# Patient Record
Sex: MALE | Race: WHITE | NOT HISPANIC OR LATINO | URBAN - METROPOLITAN AREA
[De-identification: names, ages, dates, MRNs, and addresses within clinical notes are randomized per-mention and may not be internally consistent; named-entity substitution may affect disease eponyms.]

---

## 2018-07-14 ENCOUNTER — APPOINTMENT (OUTPATIENT)
Dept: LAB | Facility: HOSPITAL | Age: 33
End: 2018-07-14
Payer: COMMERCIAL

## 2018-07-14 ENCOUNTER — TRANSCRIBE ORDERS (OUTPATIENT)
Dept: LAB | Facility: HOSPITAL | Age: 33
End: 2018-07-14

## 2018-07-14 DIAGNOSIS — Z00.00 ROUTINE GENERAL MEDICAL EXAMINATION AT A HEALTH CARE FACILITY: Primary | ICD-10-CM

## 2018-07-14 DIAGNOSIS — Z00.00 ROUTINE GENERAL MEDICAL EXAMINATION AT A HEALTH CARE FACILITY: ICD-10-CM

## 2018-07-14 LAB
ALBUMIN SERPL BCP-MCNC: 4.1 G/DL (ref 3.5–5)
ALP SERPL-CCNC: 66 U/L (ref 46–116)
ALT SERPL W P-5'-P-CCNC: 37 U/L (ref 12–78)
ANION GAP SERPL CALCULATED.3IONS-SCNC: 6 MMOL/L (ref 4–13)
AST SERPL W P-5'-P-CCNC: 22 U/L (ref 5–45)
BASOPHILS # BLD AUTO: 0.04 THOUSANDS/ΜL (ref 0–0.1)
BASOPHILS NFR BLD AUTO: 1 % (ref 0–1)
BILIRUB SERPL-MCNC: 0.6 MG/DL (ref 0.2–1)
BUN SERPL-MCNC: 17 MG/DL (ref 5–25)
CALCIUM SERPL-MCNC: 8.9 MG/DL (ref 8.3–10.1)
CHLORIDE SERPL-SCNC: 103 MMOL/L (ref 100–108)
CHOLEST SERPL-MCNC: 195 MG/DL (ref 50–200)
CO2 SERPL-SCNC: 30 MMOL/L (ref 21–32)
CREAT SERPL-MCNC: 0.98 MG/DL (ref 0.6–1.3)
EOSINOPHIL # BLD AUTO: 0.14 THOUSAND/ΜL (ref 0–0.61)
EOSINOPHIL NFR BLD AUTO: 2 % (ref 0–6)
ERYTHROCYTE [DISTWIDTH] IN BLOOD BY AUTOMATED COUNT: 12.2 % (ref 11.6–15.1)
GFR SERPL CREATININE-BSD FRML MDRD: 102 ML/MIN/1.73SQ M
GLUCOSE SERPL-MCNC: 75 MG/DL (ref 65–140)
HCT VFR BLD AUTO: 45.1 % (ref 36.5–49.3)
HDLC SERPL-MCNC: 35 MG/DL (ref 40–60)
HGB BLD-MCNC: 15 G/DL (ref 12–17)
IMM GRANULOCYTES # BLD AUTO: 0.01 THOUSAND/UL (ref 0–0.2)
IMM GRANULOCYTES NFR BLD AUTO: 0 % (ref 0–2)
LDLC SERPL CALC-MCNC: 108 MG/DL (ref 0–100)
LYMPHOCYTES # BLD AUTO: 2.38 THOUSANDS/ΜL (ref 0.6–4.47)
LYMPHOCYTES NFR BLD AUTO: 40 % (ref 14–44)
MCH RBC QN AUTO: 28.4 PG (ref 26.8–34.3)
MCHC RBC AUTO-ENTMCNC: 33.3 G/DL (ref 31.4–37.4)
MCV RBC AUTO: 85 FL (ref 82–98)
MONOCYTES # BLD AUTO: 0.62 THOUSAND/ΜL (ref 0.17–1.22)
MONOCYTES NFR BLD AUTO: 11 % (ref 4–12)
NEUTROPHILS # BLD AUTO: 2.72 THOUSANDS/ΜL (ref 1.85–7.62)
NEUTS SEG NFR BLD AUTO: 46 % (ref 43–75)
NONHDLC SERPL-MCNC: 160 MG/DL
NRBC BLD AUTO-RTO: 0 /100 WBCS
PLATELET # BLD AUTO: 202 THOUSANDS/UL (ref 149–390)
PMV BLD AUTO: 9.6 FL (ref 8.9–12.7)
POTASSIUM SERPL-SCNC: 4.2 MMOL/L (ref 3.5–5.3)
PROT SERPL-MCNC: 7.6 G/DL (ref 6.4–8.2)
RBC # BLD AUTO: 5.29 MILLION/UL (ref 3.88–5.62)
SODIUM SERPL-SCNC: 139 MMOL/L (ref 136–145)
T4 FREE SERPL-MCNC: 0.82 NG/DL (ref 0.76–1.46)
TRIGL SERPL-MCNC: 262 MG/DL
TSH SERPL DL<=0.05 MIU/L-ACNC: 4.17 UIU/ML (ref 0.36–3.74)
WBC # BLD AUTO: 5.91 THOUSAND/UL (ref 4.31–10.16)

## 2018-07-14 PROCEDURE — 86618 LYME DISEASE ANTIBODY: CPT

## 2018-07-14 PROCEDURE — 84443 ASSAY THYROID STIM HORMONE: CPT

## 2018-07-14 PROCEDURE — 85025 COMPLETE CBC W/AUTO DIFF WBC: CPT

## 2018-07-14 PROCEDURE — 80061 LIPID PANEL: CPT

## 2018-07-14 PROCEDURE — 84439 ASSAY OF FREE THYROXINE: CPT

## 2018-07-14 PROCEDURE — 36415 COLL VENOUS BLD VENIPUNCTURE: CPT

## 2018-07-14 PROCEDURE — 80053 COMPREHEN METABOLIC PANEL: CPT

## 2018-07-17 LAB
B BURGDOR IGG SER IA-ACNC: 0.09
B BURGDOR IGM SER IA-ACNC: 0.19

## 2024-07-18 ENCOUNTER — HOSPITAL ENCOUNTER (EMERGENCY)
Facility: HOSPITAL | Age: 39
Discharge: HOME | End: 2024-07-18
Attending: EMERGENCY MEDICINE

## 2024-07-18 VITALS
SYSTOLIC BLOOD PRESSURE: 173 MMHG | RESPIRATION RATE: 18 BRPM | DIASTOLIC BLOOD PRESSURE: 105 MMHG | HEART RATE: 79 BPM | TEMPERATURE: 97.4 F

## 2024-07-18 DIAGNOSIS — M54.2 NECK PAIN: Primary | ICD-10-CM

## 2024-07-18 DIAGNOSIS — I10 HYPERTENSION, UNSPECIFIED TYPE: ICD-10-CM

## 2024-07-18 PROCEDURE — 99284 EMERGENCY DEPT VISIT MOD MDM: CPT | Performed by: EMERGENCY MEDICINE

## 2024-07-18 PROCEDURE — 99283 EMERGENCY DEPT VISIT LOW MDM: CPT

## 2024-07-18 RX ORDER — METHOCARBAMOL 500 MG/1
500 TABLET, FILM COATED ORAL 2 TIMES DAILY
Qty: 20 TABLET | Refills: 0 | Status: SHIPPED | OUTPATIENT
Start: 2024-07-18

## 2024-07-18 RX ORDER — IBUPROFEN 600 MG/1
600 TABLET ORAL EVERY 6 HOURS PRN
Qty: 30 TABLET | Refills: 0 | Status: SHIPPED | OUTPATIENT
Start: 2024-07-18

## 2024-07-18 NOTE — DISCHARGE INSTRUCTIONS
I believe that your neck pain is secondary to musculoskeletal spasm related to lifting 80 pound bags of concrete.  You can take Motrin and muscle relaxants for your discomfort.  I do not believe that your pain is secondary to an injury to the blood vessel in your neck.  Your symptoms have been present for several days, and have not progressed, and you have a normal neurological exam.  You should return to the emergency department immediately if you develop numbness, tingling, weakness, change in the size of 1 pupil compared to the other, difficulty speaking, difficulty walking, severe headache, or any other concerns.  Additionally, you had an elevated blood pressure reading here today.  This does not mean that you have high blood pressure, however it does mean that your blood pressure needs to be rechecked.  You should recheck your blood pressure at a drugstore or with your primary care doctor in the next 1 week.  If your blood pressure continues to be elevated, you should follow-up with a primary care doctor regarding further evaluation and management of your blood pressure.

## 2024-07-18 NOTE — ED PROVIDER NOTES
"History  Chief Complaint   Patient presents with    Medical Problem     Felt something \"pop\" on L side of face, now having pulling sensation on left side of neck.      HPI this is a 39-year-old male who presents to the emergency department with a throbbing pain in his left temple, and a pulling pain in his neck.  Patient states that his symptoms started while lifting 80 bag pounds of concrete on Saturday.  States that it started fairly abruptly while lifting, has since gotten better, but is still present.  He came in because he was concerned and thought maybe he should do something about his symptoms.  The patient states that he does not have numbness, tingling, or weakness.  The pain is not tearing and has not radiated.  He has not changed at all since Saturday.  States that the pain is extremely mild, localizes it to his left cheek.  No tinnitus.  No dizziness.  No clumsiness.  No visual field changes or blurry vision.  The patient does not have pain with ranging his neck, but states that the pain is somewhat reproducible by palpating his neck.  Patient is healthy with no underlying history of hypertension or connective tissue disease.  Review of systems otherwise negative in 12 systems were reviewed.    None       No past medical history on file.    No past surgical history on file.    No family history on file.  I have reviewed and agree with the history as documented.    E-Cigarette/Vaping     E-Cigarette/Vaping Substances     Social History     Tobacco Use    Smoking status: Never    Smokeless tobacco: Never   Substance Use Topics    Alcohol use: Never       Review of Systems  Negative in 12 systems reviewed.  Patient states he does not routinely see primary care.  Physical Exam  Physical Exam    Vital Signs  ED Triage Vitals   Temperature Pulse Respirations Blood Pressure SpO2   07/18/24 1214 07/18/24 1214 07/18/24 1214 07/18/24 1216 --   (!) 97.4 °F (36.3 °C) 79 18 (!) 173/105       Temp Source Heart Rate " Source Patient Position - Orthostatic VS BP Location FiO2 (%)   07/18/24 1214 07/18/24 1214 -- -- --   Temporal Monitor         Pain Score       --                  Vitals:    07/18/24 1214 07/18/24 1216   BP:  (!) 173/105   Pulse: 79      On exam patient is awake and alert.  His face is symmetric.  He has no anisocoria.  His cranial nerves are intact.  The patient does not have any bruits in the neck.  He does not any masses in the neck.  He does have tenderness to palpation over sternocleidomastoid on the left.  The patient has normal rapid alternating movements.  He has normal finger-to-nose.  He has intact reflexes.  He has steady gait.  His heart is regular without murmurs, rubs, or gallop.  Lungs are clear with good air movement.  Abdomen is soft and nontender with no masses, rebound, or guarding.  His extremities are warm and well-perfused.  He is neurologically entirely intact    Visual Acuity      ED Medications  Medications - No data to display    Diagnostic Studies  Results Reviewed       None                   No orders to display              Procedures  Procedures         ED Course                                               Medical Decision Making  Risk  Prescription drug management.             MEDICAL DECISION MAKING    Number and Complexity of Problems  Differential diagnosis: Had extensive discussion with patient regarding his symptoms.  Although I doubt that the patient has a dissection, I cannot completely take it off the table given these symptoms while lifting a heavy object.  The patient does not have any neurological deficits at this time.  The patient does not wish to undergo any CAT scan or neuroimaging at this time.  He has not attempted any symptom control.  Discussed with the patient that he would like to try symptom management at home, will return for any concerning findings including change in pupil diameter, numbness, tingling, weakness, headache, difficulty walking, difficulty  speaking, or any other concerns    Medical Decision Making Data  External documents reviewed:   My EKG interpretation:   My CT interpretation:   My X-ray interpretation:   My ultrasound interpretation:     No orders to display       Labs Reviewed - No data to display    Labs reviewed by me are significant for:     Clinical decision rules/scores are significant for:     Discussed case with:   Considered admission for:     Treatment and Disposition  ED course:   Shared decision making: Patient would like to forego CT scanning at this time, this is discussed with the patient and his father  Code status: sposition  Final diagnoses:   Neck pain   Hypertension, unspecified type     Time reflects when diagnosis was documented in both MDM as applicable and the Disposition within this note       Time User Action Codes Description Comment    7/18/2024 12:32 PM Tiara Haque [M54.2] Neck pain     7/18/2024 12:32 PM Tiara Haque Add [I10] Hypertension, unspecified type           ED Disposition       ED Disposition   Discharge    Condition   Stable    Date/Time   Thu Jul 18, 2024 12:31 PM    Comment   Francoise Delpriore discharge to home/self care.                   Follow-up Information    None         Discharge Medication List as of 7/18/2024 12:35 PM        START taking these medications    Details   ibuprofen (MOTRIN) 600 mg tablet Take 1 tablet (600 mg total) by mouth every 6 (six) hours as needed for mild pain, Starting Thu 7/18/2024, Normal      methocarbamol (ROBAXIN) 500 mg tablet Take 1 tablet (500 mg total) by mouth 2 (two) times a day, Starting Thu 7/18/2024, Normal             No discharge procedures on file.    PDMP Review       None            ED Provider  Electronically Signed by             Tiara Haque MD  07/18/24 2658

## 2024-09-04 ENCOUNTER — HOSPITAL ENCOUNTER (EMERGENCY)
Facility: HOSPITAL | Age: 39
Discharge: HOME | End: 2024-09-04
Attending: EMERGENCY MEDICINE

## 2024-09-04 VITALS
RESPIRATION RATE: 18 BRPM | HEART RATE: 69 BPM | TEMPERATURE: 98.5 F | OXYGEN SATURATION: 98 % | DIASTOLIC BLOOD PRESSURE: 92 MMHG | SYSTOLIC BLOOD PRESSURE: 148 MMHG
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
## 2024-09-04 DIAGNOSIS — S40.861A INSECT BITE OF RIGHT UPPER ARM, INITIAL ENCOUNTER: Primary | ICD-10-CM

## 2024-09-04 PROCEDURE — 99284 EMERGENCY DEPT VISIT MOD MDM: CPT | Performed by: EMERGENCY MEDICINE

## 2024-09-04 PROCEDURE — 99281 EMR DPT VST MAYX REQ PHY/QHP: CPT

## 2024-09-05 NOTE — DISCHARGE INSTRUCTIONS
Return to the emergency department if your insect bite worsens, gets more swollen or red, or if you develop fever

## 2024-09-05 NOTE — ED PROVIDER NOTES
History  Chief Complaint   Patient presents with    Insect Bite     Got bit 2-3 days ago, was hanging out with friends and noticed faint red line on R arm, redness around site, tender, denies other symptoms     39-year-old male with no significant past medical history presents to ED for evaluation of insect bite.  Patient reports he got bit by an insect 2 to 3 days ago on his right upper arm.  He reports that there is surrounding redness.  He was instructed by his friends to come to the emergency department for evaluation.  The lesion is not pruritic, but he reports mild pain.  He has not taken any antihistamine medication or pain medication prior to arrival.  Denies fever, headache.        Prior to Admission Medications   Prescriptions Last Dose Informant Patient Reported? Taking?   ibuprofen (MOTRIN) 600 mg tablet   No No   Sig: Take 1 tablet (600 mg total) by mouth every 6 (six) hours as needed for mild pain   methocarbamol (ROBAXIN) 500 mg tablet   No No   Sig: Take 1 tablet (500 mg total) by mouth 2 (two) times a day      Facility-Administered Medications: None       History reviewed. No pertinent past medical history.    History reviewed. No pertinent surgical history.    History reviewed. No pertinent family history.  I have reviewed and agree with the history as documented.    E-Cigarette/Vaping     E-Cigarette/Vaping Substances     Social History     Tobacco Use    Smoking status: Never    Smokeless tobacco: Never   Substance Use Topics    Alcohol use: Never        Review of Systems   Constitutional:  Negative for chills and fever.   Respiratory:  Negative for cough and shortness of breath.    Cardiovascular:  Negative for chest pain.   Musculoskeletal:  Negative for arthralgias and joint swelling.   Skin:  Positive for color change.   Neurological:  Negative for weakness and numbness.       Physical Exam  ED Triage Vitals [09/04/24 2006]   Temperature Pulse Respirations Blood Pressure SpO2   98.5 °F (36.9  °C) 69 18 148/92 98 %      Temp Source Heart Rate Source Patient Position - Orthostatic VS BP Location FiO2 (%)   Temporal Monitor Sitting Left arm --      Pain Score       --             Orthostatic Vital Signs  Vitals:    09/04/24 2006   BP: 148/92   Pulse: 69   Patient Position - Orthostatic VS: Sitting       Physical Exam  Vitals and nursing note reviewed.   Constitutional:       General: He is not in acute distress.     Appearance: Normal appearance. He is normal weight. He is not ill-appearing, toxic-appearing or diaphoretic.   HENT:      Mouth/Throat:      Mouth: Mucous membranes are moist.   Cardiovascular:      Rate and Rhythm: Normal rate.   Pulmonary:      Effort: Pulmonary effort is normal.   Skin:     General: Skin is warm and dry.      Comments: 2 cm circular lesion with central punctum on the right upper arm.  No associated swelling.  No tenderness to palpation or drainage   Neurological:      Mental Status: He is alert.         ED Medications  Medications - No data to display    Diagnostic Studies  Results Reviewed       None                   No orders to display         Procedures  Procedures      ED Course                             SBIRT 20yo+      Flowsheet Row Most Recent Value   Initial Alcohol Screen: US AUDIT-C     1. How often do you have a drink containing alcohol? 0 Filed at: 09/04/2024 2006   2. How many drinks containing alcohol do you have on a typical day you are drinking?  0 Filed at: 09/04/2024 2006   3a. Male UNDER 65: How often do you have five or more drinks on one occasion? 0 Filed at: 09/04/2024 2006   3b. FEMALE Any Age, or MALE 65+: How often do you have 4 or more drinks on one occassion? 0 Filed at: 09/04/2024 2006   Audit-C Score 0 Filed at: 09/04/2024 2006   LAUREN: How many times in the past year have you...    Used an illegal drug or used a prescription medication for non-medical reasons? Never Filed at: 09/04/2024 2006                  Medical Decision  Making  39-year-old male presents ED for evaluation of right upper extremity lesion from an insect bite 2 to 3 days ago.  Patient has had no fever.  Differential diagnosis: I suspect localized reaction, I do not suspect cellulitis based on patient's lack of systemic symptoms, lack of pain or drainage.  Plan: Will discharge to home.  Return precautions discussed with patient including worsening of the lesion, fever, increased pain or swelling.          Disposition  Final diagnoses:   Insect bite of right upper arm, initial encounter     Time reflects when diagnosis was documented in both MDM as applicable and the Disposition within this note       Time User Action Codes Description Comment    9/4/2024  8:35 PM Ok Benson Add [S40.861A,  W57.XXXA] Insect bite of right upper arm, initial encounter           ED Disposition       ED Disposition   Discharge    Condition   Stable    Date/Time   Wed Sep 4, 2024 2035    Comment   Francoise Sobeida discharge to home/self care.                   Follow-up Information       Follow up With Specialties Details Why Contact Info    Hitesh Dotson DO Family Medicine Call  As needed [address] [facility]  [address] [place] [address]  [phone]              Discharge Medication List as of 9/4/2024  8:39 PM        CONTINUE these medications which have NOT CHANGED    Details   ibuprofen (MOTRIN) 600 mg tablet Take 1 tablet (600 mg total) by mouth every 6 (six) hours as needed for mild pain, Starting Thu 7/18/2024, Normal      methocarbamol (ROBAXIN) 500 mg tablet Take 1 tablet (500 mg total) by mouth 2 (two) times a day, Starting Thu 7/18/2024, Normal           No discharge procedures on file.    PDMP Review       None             ED Provider  Attending physically available and evaluated Francoise Sobeida. I managed the patient along with the ED Attending.    Electronically Signed by           Ok Benson DO  09/04/24 6717

## 2024-09-05 NOTE — ED ATTENDING ATTESTATION
9/4/2024  I, Josefa Gray MD, saw and evaluated the patient. I have discussed the patient with the resident/non-physician practitioner and agree with the resident's/non-physician practitioner's findings, Plan of Care, and MDM as documented in the resident's/non-physician practitioner's note, except where noted. All available labs and Radiology studies were reviewed.  I was present for key portions of any procedure(s) performed by the resident/non-physician practitioner and I was immediately available to provide assistance.       At this point I agree with the current assessment done in the Emergency Department.  I have conducted an independent evaluation of this patient a history and physical is as follows:    ED Course         Critical Care Time  Procedures    38 yo male with insect bite to right upper arm few days ago and noted mild tracking of redness.  No warmth, no drainage. No itching. Pt with no fever, no n/v/d.  Vss, afebrile, lungs cta, rrr, abdomen soft nontender, right arm with local inflammatory reaction, no signs of infection. Reassurance.

## 2025-04-30 ENCOUNTER — APPOINTMENT (EMERGENCY)
Dept: RADIOLOGY | Facility: HOSPITAL | Age: 40
End: 2025-04-30

## 2025-04-30 ENCOUNTER — HOSPITAL ENCOUNTER (EMERGENCY)
Facility: HOSPITAL | Age: 40
Discharge: HOME | End: 2025-04-30
Attending: EMERGENCY MEDICINE

## 2025-04-30 VITALS
TEMPERATURE: 98.2 F | HEART RATE: 64 BPM | OXYGEN SATURATION: 96 % | RESPIRATION RATE: 18 BRPM | SYSTOLIC BLOOD PRESSURE: 150 MMHG | DIASTOLIC BLOOD PRESSURE: 87 MMHG

## 2025-04-30 DIAGNOSIS — S09.90XA INJURY OF HEAD, INITIAL ENCOUNTER: Primary | ICD-10-CM
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
## 2025-04-30 LAB
ALBUMIN SERPL BCG-MCNC: 4.8 G/DL (ref 3.5–5)
ALP SERPL-CCNC: 64 U/L (ref 34–104)
ALT SERPL W P-5'-P-CCNC: 23 U/L (ref 7–52)
ANION GAP SERPL CALCULATED.3IONS-SCNC: 8 MMOL/L (ref 4–13)
AST SERPL W P-5'-P-CCNC: 24 U/L (ref 13–39)
BASOPHILS # BLD AUTO: 0.04 THOUSANDS/ÂΜL (ref 0–0.1)
BASOPHILS NFR BLD AUTO: 1 % (ref 0–1)
BILIRUB SERPL-MCNC: 1.35 MG/DL (ref 0.2–1)
BUN SERPL-MCNC: 18 MG/DL (ref 5–25)
CALCIUM SERPL-MCNC: 9.3 MG/DL (ref 8.4–10.2)
CHLORIDE SERPL-SCNC: 104 MMOL/L (ref 96–108)
CO2 SERPL-SCNC: 26 MMOL/L (ref 21–32)
CREAT SERPL-MCNC: 0.97 MG/DL (ref 0.6–1.3)
EOSINOPHIL # BLD AUTO: 0.11 THOUSAND/ÂΜL (ref 0–0.61)
EOSINOPHIL NFR BLD AUTO: 2 % (ref 0–6)
ERYTHROCYTE [DISTWIDTH] IN BLOOD BY AUTOMATED COUNT: 12.3 % (ref 11.6–15.1)
GFR SERPL CREATININE-BSD FRML MDRD: 98 ML/MIN/1.73SQ M
GLUCOSE SERPL-MCNC: 82 MG/DL (ref 65–140)
HCT VFR BLD AUTO: 44.4 % (ref 36.5–49.3)
HGB BLD-MCNC: 15.5 G/DL (ref 12–17)
IMM GRANULOCYTES # BLD AUTO: 0.01 THOUSAND/UL (ref 0–0.2)
IMM GRANULOCYTES NFR BLD AUTO: 0 % (ref 0–2)
LYMPHOCYTES # BLD AUTO: 1.21 THOUSANDS/ÂΜL (ref 0.6–4.47)
LYMPHOCYTES NFR BLD AUTO: 25 % (ref 14–44)
MCH RBC QN AUTO: 29.1 PG (ref 26.8–34.3)
MCHC RBC AUTO-ENTMCNC: 34.9 G/DL (ref 31.4–37.4)
MCV RBC AUTO: 83 FL (ref 82–98)
MONOCYTES # BLD AUTO: 0.54 THOUSAND/ÂΜL (ref 0.17–1.22)
MONOCYTES NFR BLD AUTO: 11 % (ref 4–12)
NEUTROPHILS # BLD AUTO: 2.95 THOUSANDS/ÂΜL (ref 1.85–7.62)
NEUTS SEG NFR BLD AUTO: 61 % (ref 43–75)
NRBC BLD AUTO-RTO: 0 /100 WBCS
PLATELET # BLD AUTO: 215 THOUSANDS/UL (ref 149–390)
PMV BLD AUTO: 9 FL (ref 8.9–12.7)
POTASSIUM SERPL-SCNC: 3.9 MMOL/L (ref 3.5–5.3)
PROT SERPL-MCNC: 7.3 G/DL (ref 6.4–8.4)
RBC # BLD AUTO: 5.33 MILLION/UL (ref 3.88–5.62)
SODIUM SERPL-SCNC: 138 MMOL/L (ref 135–147)
WBC # BLD AUTO: 4.86 THOUSAND/UL (ref 4.31–10.16)

## 2025-04-30 PROCEDURE — 70450 CT HEAD/BRAIN W/O DYE: CPT

## 2025-04-30 PROCEDURE — 99284 EMERGENCY DEPT VISIT MOD MDM: CPT

## 2025-04-30 PROCEDURE — 36415 COLL VENOUS BLD VENIPUNCTURE: CPT

## 2025-04-30 PROCEDURE — 93005 ELECTROCARDIOGRAM TRACING: CPT

## 2025-04-30 PROCEDURE — 99285 EMERGENCY DEPT VISIT HI MDM: CPT | Performed by: EMERGENCY MEDICINE

## 2025-04-30 PROCEDURE — 85025 COMPLETE CBC W/AUTO DIFF WBC: CPT

## 2025-04-30 PROCEDURE — 80053 COMPREHEN METABOLIC PANEL: CPT

## 2025-04-30 RX ORDER — ONDANSETRON 4 MG/1
4 TABLET, ORALLY DISINTEGRATING ORAL EVERY 6 HOURS PRN
Qty: 30 TABLET | Refills: 0 | Status: SHIPPED | OUTPATIENT
Start: 2025-04-30

## 2025-04-30 NOTE — Clinical Note
Francoise Vidales was seen and treated in our emergency department on 4/30/2025.            No work until cleared by family doctor/concussion clinic    Diagnosis:     Francoise  .    He may return on this date:          If you have any questions or concerns, please don't hesitate to call.      Edgardo Ni MD    ______________________________           _______________          _______________  Hospital Representative                              Date                                Time

## 2025-04-30 NOTE — ED ATTENDING ATTESTATION
4/30/2025  I, Shaheed Mujica MD, saw and evaluated the patient. I have discussed the patient with the resident/non-physician practitioner and agree with the resident's/non-physician practitioner's findings, Plan of Care, and MDM as documented in the resident's/non-physician practitioner's note, except where noted. All available labs and Radiology studies were reviewed.  I was present for key portions of any procedure(s) performed by the resident/non-physician practitioner and I was immediately available to provide assistance.       At this point I agree with the current assessment done in the Emergency Department.  I have conducted an independent evaluation of this patient a history and physical is as follows:    39-year-old man presenting after head injury.  Patient slipped and hit the back of his head 2 days ago.  Possible LOC.  Since then patient has been feeling tremulous, and has been having left-sided headache.  He also feels off balance.  No vision or hearing changes.  No neck pain or stiffness.  No fever.  Patient does subjectively feel like his left side is little weak sometimes.  On exam patient awake and alert no acute distress.  Head atraumatic normocephalic.  Neck supple.  Heart regular rate and rhythm, no murmurs rubs or gallops.  Lungs clear to auscultation bilaterally.  Cranial nerves II through XII grossly intact.  No gross focal motor or sensory deficit.  No finger-to-nose ataxia.  Will get CT head, treat headache.  If CT head is negative will refer to concussion clinic.    ED Course         Critical Care Time  Procedures

## 2025-04-30 NOTE — DISCHARGE INSTRUCTIONS
You have been evaluated in the emergency department today for a head injury.  Your labs are reassuring and your CT of your head shows no fractures or bleeding.  You likely have a concussion and you are safe to be discharged home.    Please follow-up with the concussion clinic to be reevaluated for your symptoms.  You have been provided a work note to abstain from work until you are cleared by the concussion clinic or your family doctor.  Please take Zofran as prescribed for nausea and vomiting.  Please use Tylenol Motrin as needed for fevers and pain.    Please return if you develop any new or concerning symptoms including severe sudden onset headaches, severe vision changes, or severe vomiting.

## 2025-04-30 NOTE — ED PROVIDER NOTES
Time reflects when diagnosis was documented in both MDM as applicable and the Disposition within this note       Time User Action Codes Description Comment    4/30/2025  9:00 AM Edgardo Ni Add [S09.90XA] Injury of head, initial encounter     4/30/2025  9:00 AM Edgardo Ni Add [W19.XXXA] Fall, initial encounter           ED Disposition       ED Disposition   Discharge    Condition   Stable    Date/Time   Wed Apr 30, 2025  9:00 AM    Comment   Francoise Delpriore discharge to home/self care.                   Assessment & Plan       Medical Decision Making  39-year-old male presenting today for evaluation of a fall with head strike onto a countertop 2 days ago, now with persistent headaches, nausea, subjective left-sided weakness, dizziness, and mental fog.  Patient also stating that he is having some tremors with movement.  On evaluation, patient is awake and alert, GCS 15 and he HDS.  Head is atraumatic and normocephalic.  Neck is supple with no tenderness.  Heart is regular rhythm and lungs clear to auscultation bilaterally.  Abdomen soft and nontender.  Cranial nerves II through XII grossly intact.  Strength sensation intact in the bilateral upper and lower extremities.  No ataxia present on finger-nose testing or with ambulation with heel-to-toe testing.    Differential: Concussion, arrhythmia, electrolyte abnormality, hypoglycemia.  Less likely intracranial hemorrhage or skull fracture.    Plan: Will plan to obtain CBC, CMP, EKG.  Will plan to obtain CT head to rule out intracranial abnormalities.  Patient states he does not want any medications for pain or nausea control.    Patient's labs are grossly unremarkable.  CT head is normal showing no acute fractures or bleeds.  Will plan to discharge patient home with concussion clinic.  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  Advised follow-up with family doctor as well.  Return precautions given, all questions  answered.      Amount and/or Complexity of Data Reviewed  Labs: ordered.  Radiology: ordered.    Risk  Prescription drug management.        ED Course as of 04/30/25 1149   Wed Apr 30, 2025   2053 This EKG interpreted by me. Rate 72, rhythm normal sinus, normal axis, intervals normal, no acute ST or T wave changes         Medications - No data to display    ED Risk Strat Scores                    No data recorded                            History of Present Illness       Chief Complaint   Patient presents with    Head Injury     Pt slipped and hit his head on a counter top 2 days ago. Possible LOC for a few seconds. Now he feels as though he is going to pass out and a decreased appetite.       History reviewed. No pertinent past medical history.   History reviewed. No pertinent surgical history.   History reviewed. No pertinent family history.   Social History     Tobacco Use    Smoking status: Never    Smokeless tobacco: Never   Vaping Use    Vaping status: Never Used   Substance Use Topics    Alcohol use: Never    Drug use: Not Currently     Types: Marijuana      E-Cigarette/Vaping    E-Cigarette Use Never User       E-Cigarette/Vaping Substances      I have reviewed and agree with the history as documented.     Patient is a 39-year-old male presenting today for evaluation of a fall.  Patient states that 2 days ago he was in his kitchen and slipped on something and fell, hitting his head on a countertop.  Denies any loss of consciousness as opposed to nurse triage note.  Patient states that he was able to get up afterwards and been walking normally but since that time has been having persistent headaches, nausea, dizziness, feeling unsteady, and decreased appetite.  He also reports subjective left-sided weakness of his body.  He also states that he has been having tremors since the time of the fall.  He denies any seizures, fevers, neck pain, numbness, or decree sensation.        Review of Systems    Constitutional:  Negative for chills and fever.   HENT:  Negative for congestion, rhinorrhea and sore throat.    Eyes:  Negative for pain and redness.   Respiratory:  Negative for cough and shortness of breath.    Cardiovascular:  Negative for chest pain and palpitations.   Gastrointestinal:  Positive for nausea. Negative for abdominal pain, constipation, diarrhea and vomiting.   Genitourinary:  Negative for dysuria and hematuria.   Musculoskeletal:  Negative for back pain and neck pain.   Skin:  Negative for pallor and rash.   Neurological:  Positive for dizziness, tremors, weakness and headaches. Negative for numbness.   All other systems reviewed and are negative.          Objective       ED Triage Vitals   Temperature Pulse Blood Pressure Respirations SpO2 Patient Position - Orthostatic VS   04/30/25 0704 04/30/25 0704 04/30/25 0704 04/30/25 0704 04/30/25 0704 --   98.2 °F (36.8 °C) 78 150/87 18 97 %       Temp Source Heart Rate Source BP Location FiO2 (%) Pain Score    04/30/25 0704 04/30/25 0900 -- -- 04/30/25 0704    Temporal Monitor   6      Vitals      Date and Time Temp Pulse SpO2 Resp BP Pain Score FACES Pain Rating User   04/30/25 0900 -- 64 96 % -- -- -- -- CEK   04/30/25 0805 -- -- -- -- -- 6 -- AS   04/30/25 0704 98.2 °F (36.8 °C) 78 97 % 18 150/87 6 -- BMM            Physical Exam  Vitals and nursing note reviewed.   Constitutional:       General: He is not in acute distress.     Appearance: Normal appearance. He is well-developed. He is not ill-appearing, toxic-appearing or diaphoretic.   HENT:      Head: Normocephalic and atraumatic.      Right Ear: Tympanic membrane, ear canal and external ear normal.      Left Ear: Tympanic membrane, ear canal and external ear normal.      Nose: Nose normal. No congestion or rhinorrhea.      Mouth/Throat:      Mouth: Mucous membranes are moist.   Eyes:      General: No scleral icterus.        Right eye: No discharge.         Left eye: No discharge.       Conjunctiva/sclera: Conjunctivae normal.   Cardiovascular:      Rate and Rhythm: Normal rate and regular rhythm.      Pulses: Normal pulses.      Heart sounds: Normal heart sounds. No murmur heard.     No friction rub. No gallop.   Pulmonary:      Effort: Pulmonary effort is normal. No respiratory distress.      Breath sounds: Normal breath sounds. No stridor. No wheezing, rhonchi or rales.   Abdominal:      General: Abdomen is flat. There is no distension.      Palpations: Abdomen is soft.      Tenderness: There is no abdominal tenderness. There is no guarding.   Musculoskeletal:         General: No swelling, tenderness, deformity or signs of injury. Normal range of motion.      Cervical back: Normal range of motion and neck supple. No rigidity or tenderness.   Skin:     General: Skin is warm and dry.      Capillary Refill: Capillary refill takes less than 2 seconds.      Coloration: Skin is not jaundiced or pale.   Neurological:      General: No focal deficit present.      Mental Status: He is alert and oriented to person, place, and time.      Cranial Nerves: No cranial nerve deficit.      Sensory: No sensory deficit.      Motor: No weakness.      Coordination: Coordination normal.      Gait: Gait normal.   Psychiatric:         Mood and Affect: Mood normal.         Behavior: Behavior normal.         Results Reviewed       Procedure Component Value Units Date/Time    Comprehensive metabolic panel [[id number]]  (Abnormal) Collected: 04/30/25 0800    Lab Status: Final result Specimen: Blood from Arm, Left Updated: 04/30/25 0830     Sodium 138 mmol/L      Potassium 3.9 mmol/L      Chloride 104 mmol/L      CO2 26 mmol/L      ANION GAP 8 mmol/L      BUN 18 mg/dL      Creatinine 0.97 mg/dL      Glucose 82 mg/dL      Calcium 9.3 mg/dL      AST 24 U/L      ALT 23 U/L      Alkaline Phosphatase 64 U/L      Total Protein 7.3 g/dL      Albumin 4.8 g/dL      Total Bilirubin 1.35 mg/dL      eGFR 98 ml/min/1.73sq m     Narrative:       [employer] guidelines for Chronic Kidney Disease (CKD):     Stage 1 with normal or high GFR (GFR > 90 mL/min/1.73 square meters)    Stage 2 Mild CKD (GFR = 60-89 mL/min/1.73 square meters)    Stage 3A Moderate CKD (GFR = 45-59 mL/min/1.73 square meters)    Stage 3B Moderate CKD (GFR = 30-44 mL/min/1.73 square meters)    Stage 4 Severe CKD (GFR = 15-29 mL/min/1.73 square meters)    Stage 5 End Stage CKD (GFR <15 mL/min/1.73 square meters)  Note: GFR calculation is accurate only with a steady state creatinine    CBC and differential [[id number]] Collected: 04/30/25 0800    Lab Status: Final result Specimen: Blood from Arm, Left Updated: 04/30/25 0818     WBC 4.86 Thousand/uL      RBC 5.33 Million/uL      Hemoglobin 15.5 g/dL      Hematocrit 44.4 %      MCV 83 fL      MCH 29.1 pg      MCHC 34.9 g/dL      RDW 12.3 %      MPV 9.0 fL      Platelets 215 Thousands/uL      nRBC 0 /100 WBCs      Segmented % 61 %      Immature Grans % 0 %      Lymphocytes % 25 %      Monocytes % 11 %      Eosinophils Relative 2 %      Basophils Relative 1 %      Absolute Neutrophils 2.95 Thousands/µL      Absolute Immature Grans 0.01 Thousand/uL      Absolute Lymphocytes 1.21 Thousands/µL      Absolute Monocytes 0.54 Thousand/µL      Eosinophils Absolute 0.11 Thousand/µL      Basophils Absolute 0.04 Thousands/µL             CT head wo contrast   Final Interpretation by Sofi Yang MD (04/30 0815)      No acute intracranial abnormality.                     Workstation performed: [id number]             Procedures    ED Medication and Procedure Management   Prior to Admission Medications   Prescriptions Last Dose Informant Patient Reported? Taking?   ibuprofen (MOTRIN) 600 mg tablet   No No   Sig: Take 1 tablet (600 mg total) by mouth every 6 (six) hours as needed for mild pain   methocarbamol (ROBAXIN) 500 mg tablet   No No   Sig: Take 1 tablet (500 mg total) by mouth 2 (two) times a day      Facility-Administered  Medications: None     Discharge Medication List as of 4/30/2025  9:02 AM        START taking these medications    Details   ondansetron (ZOFRAN-ODT) 4 mg disintegrating tablet Take 1 tablet (4 mg total) by mouth every 6 (six) hours as needed for nausea or vomiting, Starting Wed 4/30/2025, Normal           CONTINUE these medications which have NOT CHANGED    Details   ibuprofen (MOTRIN) 600 mg tablet Take 1 tablet (600 mg total) by mouth every 6 (six) hours as needed for mild pain, Starting Thu 7/18/2024, Normal      methocarbamol (ROBAXIN) 500 mg tablet Take 1 tablet (500 mg total) by mouth 2 (two) times a day, Starting Thu 7/18/2024, Normal             ED SEPSIS DOCUMENTATION   Time reflects when diagnosis was documented in both MDM as applicable and the Disposition within this note       Time User Action Codes Description Comment    4/30/2025  9:00 AM Edgardo Ni [S09.90XA] Injury of head, initial encounter     4/30/2025  9:00 AM Edgardo Ni [W19.XXXA] Fall, initial encounter                  Edgardo Ni MD  04/30/25 1149

## 2025-05-01 LAB
ATRIAL RATE: 72 BPM
ATRIAL RATE: 72 BPM
P AXIS: 29 DEGREES
P AXIS: 29 DEGREES
PR INTERVAL: 146 MS
PR INTERVAL: 146 MS
QRS AXIS: 59 DEGREES
QRS AXIS: 59 DEGREES
QRSD INTERVAL: 80 MS
QRSD INTERVAL: 80 MS
QT INTERVAL: 394 MS
QT INTERVAL: 394 MS
QTC INTERVAL: 432 MS
QTC INTERVAL: 432 MS
T WAVE AXIS: 33 DEGREES
T WAVE AXIS: 33 DEGREES
VENTRICULAR RATE: 72 BPM
VENTRICULAR RATE: 72 BPM

## 2025-05-01 PROCEDURE — 93010 ELECTROCARDIOGRAM REPORT: CPT | Performed by: INTERNAL MEDICINE

## 2025-05-02 ENCOUNTER — APPOINTMENT (OUTPATIENT)
Dept: RADIOLOGY | Age: 40
End: 2025-05-02

## 2025-05-02 DIAGNOSIS — M54.50 ACUTE LOW BACK PAIN WITHOUT SCIATICA, UNSPECIFIED BACK PAIN LATERALITY: ICD-10-CM

## 2025-05-02 PROCEDURE — 72110 X-RAY EXAM L-2 SPINE 4/>VWS: CPT

## 2025-05-05 ENCOUNTER — EVALUATION (OUTPATIENT)
Dept: PHYSICAL THERAPY | Facility: CLINIC | Age: 40
End: 2025-05-05
Attending: EMERGENCY MEDICINE

## 2025-05-05 DIAGNOSIS — S09.90XA INJURY OF HEAD, INITIAL ENCOUNTER: ICD-10-CM

## 2025-05-05 PROCEDURE — 97162 PT EVAL MOD COMPLEX 30 MIN: CPT | Performed by: PHYSICAL THERAPIST

## 2025-05-05 PROCEDURE — 97110 THERAPEUTIC EXERCISES: CPT | Performed by: PHYSICAL THERAPIST

## 2025-05-05 NOTE — LETTER
May 6, 2025    Hitesh Dotson DO  [address]  [address]  [facility] [address]    Patient: Francoise Vidales   YOB: 1985   Date of Visit: 2025     Encounter Diagnosis     ICD-10-CM    1. Injury of head, initial encounter  S09.90XA Ambulatory Referral to Comprehensive Concussion Program          Dear Dr. Hitesh Dotson, DO:    Thank you for your recent referral of Francoise Vidales. Please review the attached evaluation summary from Francoise's recent visit.     Please verify that you agree with the plan of care by signing the attached order.     If you have any questions or concerns, please do not hesitate to call.     I sincerely appreciate the opportunity to share in the care of one of your patients and hope to have another opportunity to work with you in the near future.       Sincerely,    Kathy Hackett, PT      Referring Provider:      I certify that I have read the below Plan of Care and certify the need for these services furnished under this plan of treatment while under my care.                    Hitesh Dotson DO  [address]  [address]  [facility] [address]  Via Fax: [phone]          PT Evaluation     Today's date: 2025  Patient name: Francoise Vidales  : 1985  MRN: [id number]  Referring provider: Shaheed Mujica MD  Dx:   Encounter Diagnosis     ICD-10-CM    1. Injury of head, initial encounter  S09.90XA Ambulatory Referral to Comprehensive Concussion Program          Start Time: 0800  Stop Time: 0845  Total time in clinic (min): 45 minutes    Assessment  Impairments: abnormal gait, activity intolerance, impaired balance, impaired physical strength, lacks appropriate home exercise program, pain with function, weight-bearing intolerance, poor posture , poor body mechanics, participation limitations and activity limitations  Symptom irritability: moderate    Assessment details: Francoise is a 38 y/o male presenting to physical therapy s/p head injury  due to a fall at home that occurred on 04/28/2025. Patient reports progressive improvements in concussion symptoms, reporting minimal intermittent headaches of short duration. Patient denies dizziness and nausea at this time. Patient's current concerns are persistent R sided low back pain that radiates into R anterior thigh. Patient reports R sided weakness and apprehension to bear weight through RLE, which is why he currently is utilizing B/l crutches for ambulation. Patient was educated in natural recovery process of concussion and as per concussion protocol was encouraged to begin regular activities as tolerated keeping in mind concern for right sided back pain. Patient was advised to avoid long naps during the day in order to prioritize restorative sleep needed for recovery, as well as to aide in preservation of energy levels and improve mood. Patient at this time is most concerned with activity and work limitations due his low back pain, it was recommended that patient be seen at ortho clinic for treatment of back pain.     Upon examination, patient presents with guarded resting posture with slight lateral shift towards unaffected side in seated position. Patient presents with antalgic-like gait, with decreased R step length, decreased B/l knee extension, and touchdown weightbearing on RLE. Patient tender to palpation over L5 and below segments and along posterior R lower quadrant. Limited lumbar AROM into flexion due to pain with reported peripheralization of symptoms into R thigh. Centralization reported with extension, however with unchanging low back pain.     Demonstration and completion of prone press ups to elbows completed with good tolerance and understanding. SPT and PT discussed benefits of transition to skilled orthopedic care at this time in order to address above limitations and concerns with low back pain moving forward in which patient was receptive to. PT will help facilitate transition at this  time.  Barriers to intervention: transportation and medical complexity  Understanding of Dx/Px/POC: good     Prognosis: good    Goals  STG:  Patient will be independent with HEP in 4 weeks  Patient will report improved low back pain to 5/10 or less at worst demonstrating an improvement in low back pain severity in 4 weeks  Patient will no longer report peripheral symptoms with improved AROM and tolerance in flexion in 4 weeks  Patient will be able to ambulate without the use of crutches in 4 weeks.     LTG:  Patient will report 3/10 or less pain demonstrating improved pain management allowing for increased activity and improved sleep quality in 8 weeks  Patient will return to work with no restrictions in 8 weeks.  Patient will return to biking and previous exercise routine in 8 weeks.            Plan  Patient would benefit from: skilled physical therapy  Referral necessary: Yes    Planned therapy interventions: neuromuscular re-education, patient/caregiver education, balance, coordination, strengthening, stretching, therapeutic activities, therapeutic exercise, transfer training, therapeutic training, work reintegration, sensory integrative techniques, postural training, nerve gliding, manual therapy, joint mobilization, kinesiology taping, activity modification, ADL retraining, abdominal trunk stabilization, motor coordination training, functional ROM exercises, gait training, graded activity, graded exercise, graded motor and home exercise program    Frequency: 2x week  Duration in weeks: 8  Plan of Care beginning date: 5/5/2025  Plan of Care expiration date: 6/30/2025  Treatment plan discussed with: patient  Plan details: Plan discussed with patient to begin transition to skilled orthopedic care to address concerns of persistent low back pain moving forward with good understanding.     Subjective Evaluation    History of Present Illness  Date of onset: 4/28/2025  Mechanism of injury: Mel is a 40 y/o male  presenting to physical therapy s/p head injury due to a fall at home on 2025. Patient reports getting ready for work in the morning when he slipped and fell, hitting his back against the bathroom door prior to falling to the floor. Patient feels that he may have lost consciousness and believes he may have hit his head on the bathroom floor. Patient reported to the ED in which he was cleared for any red flag head or neck injuries. Since returning home, concussion symptoms have been progressively improved. Patient reports intermittent, short duration headaches and denies dizziness and nausea. No reported Hx of concussions, however does manage migraines with medication use. Patient is most concerned with recurrent R sided low back pain that radiates into anterior R thigh. Patient reports R sided weakness that has persisted since his fall, causing him to feel off balance when walking. Patient currently utilizing crutches for ambulation as he is apprehensive to bear weight through RLE due to pain and weakness. Reported trouble staying asleep throughout the night due to back pain when changing positions in bed. Patient is currently not driving however plans to return to work as he his able with restrictions.       Quality of life: good    Patient Goals  Patient goals for therapy: decreased pain, increased strength, return to sport/leisure activities, return to work and improved balance    Pain  Current pain ratin  At best pain ratin  At worst pain ratin  Location: R sided low back pain that radiates in to anterior thigh  Quality: radiating  Relieving factors: rest and medications  Aggravating factors: lifting, sitting, standing and walking    Social Support  Steps to enter house: yes  3  Stairs in house: yes (first floor set up available)   Lives in: multiple-level home  Lives with: parents (father)    Employment status: working ([employer]; missed 3 days; maintenence; heavy lifting)  Exercise  history: Rides bike; 2x/week in summer      Diagnostic Tests  X-ray: normal  CT scan: normal  Treatments  Current treatment: medication    Objective      Patient Symptom Severity Score:  Total number of symptoms (max. 22): 11  Symptom Severity Score (max. 132):   30 / 132      Subjective:  Concussion History    Mechanism of Concussion Fall   Concurrent Injury? No   Date of injury 04/28/2025   [place]/retrograde amnesia? No   Initial symptoms  Headache, Dizziness, Nausea, Fogginess, and Poor sleep due to LBP   History of prior concussion? No   Imaging? Yes   Any exertional, orthopedic, sports, or academic limitations? Yes; work limitations TBD      Dysequilibrium: No  Lightheadedness: No  Vertigo: No  Rocking or Swaying: No         Oscillopsia: No  Diplopia: No  Motion sickness: No  Floating, Swimming, Disconnected: No    Exacerbation Factors:  Bending over: No  Turning Head: No  Looking up: No  Rolling in bed: No  Supine to/from sitting: No  Optokinetic movement: No  Walking: No  Walking in busy environment: No     Concurrent Complaints:  Tinnitus:No  Aural Fullness:No  Known hearing loss:No  Nausea, Vomiting: No  Altered Vision: No  Poor Concentration: No  Memory Loss: No  Peripheral Neuropathy:No      Dizziness subjective: Denies dizziness   How long does dizziness last?    How would you describe the dizziness?  Rolling in bed: No  Supine to/from sitting: No     Cervical Pain subjective:    Intensity:         Best:0        Worst:4        Average: 1  Exacerbating Factors: Movement   Relieving Factors: rest        Headache: Frequency: once every other day  Duration: 45 mins or less; intermittent  Intensity:         Best:0        Worst:6        Average: 3  Location: L side of head; sometimes travels to R side across forehead   Exacerbating Factors: bright screens; bright lights; smells  Relieving Factors: rest; OTC    Cervical 3       Cervical Spine Examination:    Resting posture:      Normal    Cervical spine active  range of motion:   within normal limits      Lumbar Spine Examination:  Complaints of R sided LBP that radiates into anterior thigh     Resting posture: Guarded; lateral shift away from affected R side when sitting    AROM: Limited flexion due to pain;   Aggravating factors: bending forward  Relieving factors: extension with centralization of Sx; rest; OTC medication     Gait analysis: utilizing B/l crutches; Decreased B/l knee extension; Touchdown weightbearing on RLE.     Apprehension to weight bear through RLE due to radiating pain. Gross R sided weakness as compared to LLE.    Tender to palpation over L5 and below segments and lower R posterior quadrant     MMT:  Hip flexion: R 3+ ; L 5  Knee flexion: R 4- ; L 5  Knee extension: R 4- ; L 5  Ankle DF: R 3+ ; L 4      Vestibular Oculomotor Screening: Not applicable   Oculomotor ROM :  Resting nystagmus:   Gaze holding nystagmus      Smooth pursuit   Horizontal    Vertical      Saccades:  Horizontal    Vertical  n      Convergence:    Distance:     Vestibular Ocular Reflex   Horizontal:  Vertical:    Horizontal Head Impulse:     Cover/Uncover/Crosscover Test:     Dynamic Visual Acuity:   Static Head: 20/  Dynamic Head: 20/      Balance testing: not applicable   FGA:   Rae test:        Positional testing: Not applicable   Positional testing: Right Left   [place]     Roll test:         [place] Concussion Treadmill Test: not applicable  Starting speed is 3.3 mph (modify if needed) and 0% incline   Discontinue test if symptoms on VAS >3  If max incline is reached without symptoms, increase speed to 0.4 mph each minute    BP Pre:  BP Post:     Minutes Incline RPE VAS Heart Rate   1 min 0%  HA:  /10  D:  /10 X   2 min 1%  HA:  /10  D:  /10    3min  2%  HA:  /10  D:  /10 X   4 min  3%  HA:  /10  D:  /10    5 min  4%  HA:  /10  D:  /10 X   6 min 5%  HA:  /10  D:  /10    7 min 6%  HA:  /10  D:  /10 X   8 min 7%  HA:  /10  D:  /10    9 min 8%  HA:  /10  D:  /10 X   10  "min 9%  HA:  /10  D:  /10    11 min 10%  HA:  /10  D:  /10 X   12 min 11%  HA:  /10  D:  /10    13 min 12%  HA:  /10  D:  /10 X   14 min 13%  HA:  /10  D:  /10    15 min  14%  HA:  /10  D:  /10 X        Flowsheet Rows      Flowsheet Row Most Recent Value   Adult Concussion Symptom Score    Headache 5   Pressure in head 5   Neck pain 2   Nausea or vomiting 0   Dizziness 0   Blurred vision 0   Balance problems 3   Sensitivity to light 0   Sensitivity to noise 0   Feeling slowed down 2   Feeling like \"in a fog\" 2   \"Don't feel right\" 1   Difficulty concentrating 2   Difficulty remembering 0   Fatigue or low energy 0   Confusion 0   Drowsiness 0   Trouble falling asleep 0   More emotional 0   Irritability 2   Sadness 3   Nervous or Anxious 3   Symptom Severity Score 30            Short Term Goal Expiration Date:(N/a)  Long Term Goal Expiration Date: (N/a)  POC Expiration Date: (N/a)    Visit count: 1 of 10; PN due     POC expires Unit limit Auth Expiration date PT/OT/ST + Visit Limit?     Self pay                            Visit/Unit Tracking  AUTH Status:  Date 5/5             Self pay Used               Remaining                       Manuals 5/5       SOR cervical traction         Mobs                         Neuro Re-Ed         Saccades         edcuation Natural progression of concussion recovery. Driving recommendations, sleeping and diet recommendations, avoiding alcohol and caffeine. Advised against avoiding activities.                                                Ther Ex        Cervical stretches         Lumbar Exercises Prone press-ups to elbows    Gradually progress to full press-up as tolerated    3x10     Reviewed for home        Education  Reviewed proper progression of activity to allow for tolerance but to avoid exacerbation of symptoms and carry over affect.                                                       Ther Activity                        Gait Training                        Modalities      "

## 2025-05-05 NOTE — PROGRESS NOTES
PT Re-Evaluation     Today's date: 2025  Patient name: Francoise Vidales  : 1985  MRN: [id number]  Referring provider: Shaheed Mujica MD  Dx:   Encounter Diagnosis     ICD-10-CM    1. Lumbar back pain with radiculopathy affecting right lower extremity  M54.16           Start Time: 0845  Stop Time: 0930  Total time in clinic (min): 45 minutes    Assessment  Impairments: abnormal coordination, abnormal gait, abnormal muscle firing, abnormal muscle tone, abnormal or restricted ROM, abnormal movement, activity intolerance, impaired balance, impaired physical strength, lacks appropriate home exercise program, pain with function, weight-bearing intolerance, poor posture , poor body mechanics, unable to perform ADL, participation limitations, activity limitations and endurance  Symptom irritability: moderate    Assessment details: Francoise Vidales is a pleasant 39 y.o. male transferred from [address] [place] [address] secondary to residual right-sided lumbar radiculopathy symptoms s/p fall at home on 2025. At this time, concussion symptoms have resolved per patient and [address] [place] [address] clinician consult. Patient's symptoms have slightly improved in regards to his symptoms over the last week, but continue to be debilitating. Upon further clinical testing, patient presents with the following deficits: lumbar mechanical derangement, active lumbar motor dysfunction, peripheral neurogenic symptoms related to potentially discogenic involvement, high levels of guarding, RLE weakness, gait deficits, decrease WB of RLE, and altered myotomes at L4-L5 levels. These deficits and impairments result inability to walk without AD, inability to return to work, and decrease functional tolerance. Pt was provided with a basic HEP focused on neurodynamic gliders and Marina-biased exercises via prone press-up which will be reviewed in the upcoming session. Pt able to demonstrate HEP with good technique and no pain. Educated pt to  stop any exercises causing pain increase, pt verbalizes understanding. Pt was educated on anatomy and physiology of diagnosis and demonstrated verbal understanding. Positive prognostic indicators include positive attitude toward recovery, good understanding of diagnosis and treatment plan options, and acuity of symptoms. Negative prognostic indicators include high symptom irritability, degree of peripheralization, and minimal changes in pain with movement. Pt would benefit from skilled OP physical therapy to address these, and the below impairments in order to optimize outcomes and promote return to functional baseline.     Patient verbalized understanding of POC.    Please contact me if you have any questions or recommendations. Thank you for the referral and the opportunity to share in [facility]'s care      Understanding of Dx/Px/POC: excellent     Prognosis: good    Goals  STG:  Patient will be independent with HEP in 4 weeks  Patient will report improved low back pain to 2-3/10 or less at worst demonstrating an improvement in low back pain severity in 4 weeks  Patient will no longer report peripheral symptoms with improved AROM and tolerance in flexion in 4 weeks  Patient will be able to ambulate without the use of crutches in 4 weeks.      LTG:  Patient will report 0-1/10 or less pain demonstrating improved pain management allowing for increased activity and improved sleep quality in 8 weeks  Patient will return to work with no restrictions in 8 weeks.  Patient will return to biking and previous exercise routine in 8 weeks.  Patient will be able to return to work at full capacity        Plan  Patient would benefit from: PT eval and skilled physical therapy  Referral necessary: No  Planned modality interventions: cryotherapy, biofeedback and TENS    Planned therapy interventions: activity modification, ADL retraining, joint mobilization, manual therapy, massage, behavior modification, body mechanics training,  "muscle pump exercises, motor coordination training, nerve gliding, neuromuscular re-education, patient education, postural training, community reintegration, self care, sensory integrative techniques, strengthening, stretching, therapeutic activities, therapeutic exercise, therapeutic training, home exercise program, graded motor, graded exercise, graded activity, functional ROM exercises, gait training, abdominal trunk stabilization and patient/caregiver education    Frequency: 1-2x week  Plan of Care beginning date: 5/7/2025  Plan of Care expiration date: 7/16/2025  Treatment plan discussed with: patient        Subjective Evaluation    History of Present Illness  Mechanism of injury: trauma  Mechanism of injury: Patient presents as a transfer patient from [address] [place] [address]. Per recent PMH taken \" s/p head injury due to a fall at home on 4/28/2025. Patient reports getting ready for work in the morning when he slipped and fell, hitting his back against the bathroom door prior to falling to the floor. Patient feels that he may have lost consciousness and believes he may have hit his head on the bathroom floor. Patient reported to the ED in which he was cleared for any red flag head or neck injuries. Since returning home, concussion symptoms have been progressively improved. Patient reports intermittent, short duration headaches and denies dizziness and nausea. No reported Hx of concussions, however does manage migraines with medication use. Patient is most concerned with recurrent R sided low back pain that radiates into anterior R thigh. Patient reports R sided weakness that has persisted since his fall, causing him to feel off balance when walking. Patient currently utilizing crutches for ambulation as he is apprehensive to bear weight through RLE due to pain and weakness. Reported trouble staying asleep throughout the night due to back pain when changing positions in bed. Patient is currently not driving however " "plans to return to work as he his able with restrictions.\"    IE: immediately after the fall, both legs felt like \"jello.\" Days after it started to feel a little better. He able to put more WB on the RLE. Tingling near lower calf. Soreness around R hip.     Denies any red flags which include: fever, chills, chest pain, focal neurologic deficits, urinary distention, urinary incontinence, fecal incontinence, saddle anesthesia.    Aggravating Factors: walking, STS transfer, sleeping the night, prolonged standing, driving,  Relieving Factors:     Personal Functional Goals: return to work            Not a recurrent problem   Quality of life: good    Patient Goals  Patient goals for therapy: increased strength, independence with ADLs/IADLs, return to sport/leisure activities, return to work, increased motion and decreased pain    Pain  Current pain ratin  At best pain ratin  At worst pain ratin  Location: R sided low back pain that radiates in to anterior thigh  Quality: radiating, discomfort and sharp  Relieving factors: medications, rest and relaxation  Aggravating factors: lifting, standing, walking and sitting    Social Support  Steps to enter house: yes  3  Stairs in house: yes   Lives in: multiple-level home  Lives with: parents (Father)    Employment status: working (working ([employer]; missed 3 days; maintenence; heavy lifting))  Exercise history: Rides bike; 2x/week in summer  Life stress: High      Diagnostic Tests  X-ray: abnormal (No acute osseous abnormality. Minimal chronic appearing superior endplate compression deformities of T12 and L1.)  Treatments  No previous or current treatments        Objective      Strength and ROM evaluated B from a regional biomechanical perspective and values relevant to this episode recorded in tables below.    ROM:   Joint / Motion  Right  Left    Lumbar Flexion  25% restriction  P!    Lumbar Extension  WNL     Lumbar Sidebending  WNL WNL P!    Lumbar " Rotation WNL WNL P!   Hip ER  WFL  WFL    Hip IR  WFL WFL     Repeated Movements:  Marina Assessment  Rep FIS: Peripheralization  Rep EIS: Centralization   Rep EIL: Centralization      LE MMT Strength:  Test Action RIGHT  LEFT   Hip Flexion 3+/5 P! 5/5   Knee Extension 4-/5 5/5   Knee Flexion 4-/5 5/5   Ankle DF 4-/5 5/5   Ankle PF 4/5 5/5     Additional Assessments:  Pain with palpation noted: Tender to palpation grossly along L4 and below segments and lower R posterior quadrant   Passive intervertebral motion: Good springing, no significant restriction with PAVIMS  Gait: utilizing B/l crutches; Decreased B/l knee extension; partial weightbearing on RLE.   Squat: Unable to perform      NEURO Screen  Reflexes  Right Left   Patellar (L3-L4) 2+ 2+   Achilles (S1-S2) 2+ 2+   Cevallos's N N   Clonus N N        LE Myotomes:  Nerve root Test Action RIGHT LEFT   L1-L2 Hip Flexion Intact intact   L3 Knee Extension intact intact   L4  Ankle DF Decrease intact   L5 Great toe Extension Decrease intact   S1 Ankle PF, Ankle Eversion, Hip Extension, Knee flexion intact intact   S2 Ankle PF Intact intact       LE Dermatomes: intact bilaterally    Special Tests:  Lumbar Specific and Neural Tension                                                                            Test / Measure  Right 5/7/2025 Left 5/7/2025   Straight Leg raise + N   Slump test + N            Precautions: Recent fall     POC expires Unit limit Auth Expiration date PT/OT + Visit Limit?   7/16/25 NA NA NA  Self-pay              Pertinent Findings:                                                                                             Test / Measure  5/7/25   FOTO (Predicted )    Lumbar Flexion 25%    R MMT 3+ to 4-/5   Slump/SLR + RLE         Manuals 5/7            Lumbar Central P-As Mobilization             Low Grade Lumbar Gapping                                       Neuro Re-Ed             Birddog             Wall SLR             Sciatic Nerve  Flossing (90/90 Stretch)               Slump Nerve Glides             Pball TrA Core Press with Bridge             EOT Hip Extensions                                       Ther Ex             NuStep             Piriformis Stretch             Cat-Cow Stretch             SL Reverse Clamshells                                                                              Ther Activity                                       Gait Training                                       Modalities

## 2025-05-05 NOTE — PROGRESS NOTES
PT Evaluation     Today's date: 2025  Patient name: Francoise Vidales  : 1985  MRN: [id number]  Referring provider: Shaheed Mujica MD  Dx:   Encounter Diagnosis     ICD-10-CM    1. Injury of head, initial encounter  S09.90XA Ambulatory Referral to Comprehensive Concussion Program          Start Time: 0800  Stop Time: 0845  Total time in clinic (min): 45 minutes    Assessment  Impairments: abnormal gait, activity intolerance, impaired balance, impaired physical strength, lacks appropriate home exercise program, pain with function, weight-bearing intolerance, poor posture , poor body mechanics, participation limitations and activity limitations  Symptom irritability: moderate    Assessment details: Francoise is a 38 y/o male presenting to physical therapy s/p head injury due to a fall at home that occurred on 2025. Patient reports progressive improvements in concussion symptoms, reporting minimal intermittent headaches of short duration. Patient denies dizziness and nausea at this time. Patient's current concerns are persistent R sided low back pain that radiates into R anterior thigh. Patient reports R sided weakness and apprehension to bear weight through RLE, which is why he currently is utilizing B/l crutches for ambulation. Patient was educated in natural recovery process of concussion and as per concussion protocol was encouraged to begin regular activities as tolerated keeping in mind concern for right sided back pain. Patient was advised to avoid long naps during the day in order to prioritize restorative sleep needed for recovery, as well as to aide in preservation of energy levels and improve mood. Patient at this time is most concerned with activity and work limitations due his low back pain, it was recommended that patient be seen at ortho clinic for treatment of back pain.     Upon examination, patient presents with guarded resting posture with slight lateral shift towards  unaffected side in seated position. Patient presents with antalgic-like gait, with decreased R step length, decreased B/l knee extension, and touchdown weightbearing on RLE. Patient tender to palpation over L5 and below segments and along posterior R lower quadrant. Limited lumbar AROM into flexion due to pain with reported peripheralization of symptoms into R thigh. Centralization reported with extension, however with unchanging low back pain.     Demonstration and completion of prone press ups to elbows completed with good tolerance and understanding. SPT and PT discussed benefits of transition to skilled orthopedic care at this time in order to address above limitations and concerns with low back pain moving forward in which patient was receptive to. PT will help facilitate transition at this time.  Barriers to intervention: transportation and medical complexity  Understanding of Dx/Px/POC: good     Prognosis: good    Goals  STG:  Patient will be independent with HEP in 4 weeks  Patient will report improved low back pain to 5/10 or less at worst demonstrating an improvement in low back pain severity in 4 weeks  Patient will no longer report peripheral symptoms with improved AROM and tolerance in flexion in 4 weeks  Patient will be able to ambulate without the use of crutches in 4 weeks.     LTG:  Patient will report 3/10 or less pain demonstrating improved pain management allowing for increased activity and improved sleep quality in 8 weeks  Patient will return to work with no restrictions in 8 weeks.  Patient will return to biking and previous exercise routine in 8 weeks.            Plan  Patient would benefit from: skilled physical therapy  Referral necessary: Yes    Planned therapy interventions: neuromuscular re-education, patient/caregiver education, balance, coordination, strengthening, stretching, therapeutic activities, therapeutic exercise, transfer training, therapeutic training, work reintegration,  sensory integrative techniques, postural training, nerve gliding, manual therapy, joint mobilization, kinesiology taping, activity modification, ADL retraining, abdominal trunk stabilization, motor coordination training, functional ROM exercises, gait training, graded activity, graded exercise, graded motor and home exercise program    Frequency: 2x week  Duration in weeks: 8  Plan of Care beginning date: 5/5/2025  Plan of Care expiration date: 6/30/2025  Treatment plan discussed with: patient  Plan details: Plan discussed with patient to begin transition to skilled orthopedic care to address concerns of persistent low back pain moving forward with good understanding.     Subjective Evaluation    History of Present Illness  Date of onset: 4/28/2025  Mechanism of injury: Mel is a 38 y/o male presenting to physical therapy s/p head injury due to a fall at home on 4/28/2025. Patient reports getting ready for work in the morning when he slipped and fell, hitting his back against the bathroom door prior to falling to the floor. Patient feels that he may have lost consciousness and believes he may have hit his head on the bathroom floor. Patient reported to the ED in which he was cleared for any red flag head or neck injuries. Since returning home, concussion symptoms have been progressively improved. Patient reports intermittent, short duration headaches and denies dizziness and nausea. No reported Hx of concussions, however does manage migraines with medication use. Patient is most concerned with recurrent R sided low back pain that radiates into anterior R thigh. Patient reports R sided weakness that has persisted since his fall, causing him to feel off balance when walking. Patient currently utilizing crutches for ambulation as he is apprehensive to bear weight through RLE due to pain and weakness. Reported trouble staying asleep throughout the night due to back pain when changing positions in bed. Patient is  currently not driving however plans to return to work as he his able with restrictions.       Quality of life: good    Patient Goals  Patient goals for therapy: decreased pain, increased strength, return to sport/leisure activities, return to work and improved balance    Pain  Current pain ratin  At best pain ratin  At worst pain ratin  Location: R sided low back pain that radiates in to anterior thigh  Quality: radiating  Relieving factors: rest and medications  Aggravating factors: lifting, sitting, standing and walking    Social Support  Steps to enter house: yes  3  Stairs in house: yes (first floor set up available)   Lives in: multiple-level home  Lives with: parents (father)    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 22): 11  Symptom Severity Score (max. 132):   30 / 132      Subjective:  Concussion History    Mechanism of Concussion Fall   Concurrent Injury? No   Date of injury 2025   Ashwin/retrograde amnesia? No   Initial symptoms  Headache, Dizziness, Nausea, Fogginess, and Poor sleep due to LBP   History of prior concussion? No   Imaging? Yes   Any exertional, orthopedic, sports, or academic limitations? Yes; work limitations TBD      Dysequilibrium: No  Lightheadedness: No  Vertigo: No  Rocking or Swaying: No         Oscillopsia: No  Diplopia: No  Motion sickness: No  Floating, Swimming, Disconnected: No    Exacerbation Factors:  Bending over: No  Turning Head: No  Looking up: No  Rolling in bed: No  Supine to/from sitting: No  Optokinetic movement: No  Walking: No  Walking in busy environment: No     Concurrent Complaints:  Tinnitus:No  Aural Fullness:No  Known hearing loss:No  Nausea, Vomiting: No  Altered  Vision: No  Poor Concentration: No  Memory Loss: No  Peripheral Neuropathy:No      Dizziness subjective: Denies dizziness   How long does dizziness last?    How would you describe the dizziness?  Rolling in bed: No  Supine to/from sitting: No     Cervical Pain subjective:    Intensity:         Best:0        Worst:4        Average: 1  Exacerbating Factors: Movement   Relieving Factors: rest        Headache: Frequency: once every other day  Duration: 45 mins or less; intermittent  Intensity:         Best:0        Worst:6        Average: 3  Location: L side of head; sometimes travels to R side across forehead   Exacerbating Factors: bright screens; bright lights; smells  Relieving Factors: rest; OTC    Cervical 3       Cervical Spine Examination:    Resting posture:      Normal    Cervical spine active range of motion:   within normal limits      Lumbar Spine Examination:  Complaints of R sided LBP that radiates into anterior thigh     Resting posture: Guarded; lateral shift away from affected R side when sitting    AROM: Limited flexion due to pain;   Aggravating factors: bending forward  Relieving factors: extension with centralization of Sx; rest; OTC medication     Gait analysis: utilizing B/l crutches; Decreased B/l knee extension; Touchdown weightbearing on RLE.     Apprehension to weight bear through RLE due to radiating pain. Gross R sided weakness as compared to LLE.    Tender to palpation over L5 and below segments and lower R posterior quadrant     MMT:  Hip flexion: R 3+ ; L 5  Knee flexion: R 4- ; L 5  Knee extension: R 4- ; L 5  Ankle DF: R 3+ ; L 4      Vestibular Oculomotor Screening: Not applicable   Oculomotor ROM :  Resting nystagmus:   Gaze holding nystagmus      Smooth pursuit   Horizontal    Vertical      Saccades:  Horizontal    Vertical  n      Convergence:    Distance:     Vestibular Ocular Reflex   Horizontal:  Vertical:    Horizontal Head Impulse:     Cover/Uncover/Crosscover Test:  "    Dynamic Visual Acuity:   Static Head: 20/  Dynamic Head: 20/      Balance testing: not applicable   FGA:   Rae test:        Positional testing: Not applicable   Positional testing: Right Left   [place] pike     Roll test:         Robeson Concussion Treadmill Test: not applicable  Starting speed is 3.3 mph (modify if needed) and 0% incline   Discontinue test if symptoms on VAS >3  If max incline is reached without symptoms, increase speed to 0.4 mph each minute    BP Pre:  BP Post:     Minutes Incline RPE VAS Heart Rate   1 min 0%  HA:  /10  D:  /10 X   2 min 1%  HA:  /10  D:  /10    3min  2%  HA:  /10  D:  /10 X   4 min  3%  HA:  /10  D:  /10    5 min  4%  HA:  /10  D:  /10 X   6 min 5%  HA:  /10  D:  /10    7 min 6%  HA:  /10  D:  /10 X   8 min 7%  HA:  /10  D:  /10    9 min 8%  HA:  /10  D:  /10 X   10 min 9%  HA:  /10  D:  /10    11 min 10%  HA:  /10  D:  /10 X   12 min 11%  HA:  /10  D:  /10    13 min 12%  HA:  /10  D:  /10 X   14 min 13%  HA:  /10  D:  /10    15 min  14%  HA:  /10  D:  /10 X        Flowsheet Rows      Flowsheet Row Most Recent Value   Adult Concussion Symptom Score    Headache 5   Pressure in head 5   Neck pain 2   Nausea or vomiting 0   Dizziness 0   Blurred vision 0   Balance problems 3   Sensitivity to light 0   Sensitivity to noise 0   Feeling slowed down 2   Feeling like \"in a fog\" 2   \"Don't feel right\" 1   Difficulty concentrating 2   Difficulty remembering 0   Fatigue or low energy 0   Confusion 0   Drowsiness 0   Trouble falling asleep 0   More emotional 0   Irritability 2   Sadness 3   Nervous or Anxious 3   Symptom Severity Score 30            Short Term Goal Expiration Date:(N/a)  Long Term Goal Expiration Date: (N/a)  POC Expiration Date: (N/a)    Visit count: 1 of 10; PN due     POC expires Unit limit Auth Expiration date PT/OT/ST + Visit Limit?     Self pay                            Visit/Unit Tracking  AUTH Status:  Date 5/5             Self pay Used               " Remaining                       Manuals 5/5       SOR cervical traction         Mobs                         Neuro Re-Ed         Saccades         edcuation Natural progression of concussion recovery. Driving recommendations, sleeping and diet recommendations, avoiding alcohol and caffeine. Advised against avoiding activities.                                                Ther Ex        Cervical stretches         Lumbar Exercises Prone press-ups to elbows    Gradually progress to full press-up as tolerated    3x10     Reviewed for home        Education  Reviewed proper progression of activity to allow for tolerance but to avoid exacerbation of symptoms and carry over affect.                                                       Ther Activity                        Gait Training                        Modalities

## 2025-05-07 ENCOUNTER — EVALUATION (OUTPATIENT)
Dept: PHYSICAL THERAPY | Facility: REHABILITATION | Age: 40
End: 2025-05-07

## 2025-05-07 DIAGNOSIS — M54.16 LUMBAR BACK PAIN WITH RADICULOPATHY AFFECTING RIGHT LOWER EXTREMITY: Primary | ICD-10-CM

## 2025-05-07 PROCEDURE — 97164 PT RE-EVAL EST PLAN CARE: CPT

## 2025-05-07 NOTE — HOME EXERCISE EDUCATION
Program_ID:[id number]   Access Code: [email or web address]  URL: [email or web address]  Date: 05-  Prepared By: Zuleyka Raya    Program Notes      Exercises      - Seated Slump Nerve Glide - 1-3 x daily - 7 x weekly - 1 sets - 10 reps      - Sitting Slump LE Nerve Mobilization - 1-3 x daily - 7 x weekly - 1 sets - 10 reps      - Leg Swing Single Leg Balance - 1-3 x daily - 7 x weekly - 1 sets - 10 reps      - Prone Press Up On Elbows - 1-3 x daily - 7 x weekly - 2 sets - 10 reps

## 2025-05-15 ENCOUNTER — APPOINTMENT (OUTPATIENT)
Dept: PHYSICAL THERAPY | Facility: REHABILITATION | Age: 40
End: 2025-05-15

## 2025-05-19 ENCOUNTER — APPOINTMENT (OUTPATIENT)
Dept: PHYSICAL THERAPY | Facility: REHABILITATION | Age: 40
End: 2025-05-19

## 2025-05-22 ENCOUNTER — APPOINTMENT (OUTPATIENT)
Dept: PHYSICAL THERAPY | Facility: REHABILITATION | Age: 40
End: 2025-05-22

## 2025-05-28 ENCOUNTER — APPOINTMENT (OUTPATIENT)
Dept: PHYSICAL THERAPY | Facility: REHABILITATION | Age: 40
End: 2025-05-28

## 2025-05-30 ENCOUNTER — APPOINTMENT (OUTPATIENT)
Dept: PHYSICAL THERAPY | Facility: REHABILITATION | Age: 40
End: 2025-05-30

## 2025-07-15 ENCOUNTER — HOSPITAL ENCOUNTER (EMERGENCY)
Facility: HOSPITAL | Age: 40
Discharge: HOME | End: 2025-07-15
Attending: EMERGENCY MEDICINE | Admitting: EMERGENCY MEDICINE
Payer: COMMERCIAL

## 2025-07-15 VITALS
DIASTOLIC BLOOD PRESSURE: 89 MMHG | OXYGEN SATURATION: 97 % | HEART RATE: 64 BPM | TEMPERATURE: 98.9 F | RESPIRATION RATE: 18 BRPM | SYSTOLIC BLOOD PRESSURE: 165 MMHG

## 2025-07-15 DIAGNOSIS — S05.02XA ABRASION OF LEFT CORNEA, INITIAL ENCOUNTER: ICD-10-CM

## 2025-07-15 DIAGNOSIS — S05.92XA LEFT EYE INJURY, INITIAL ENCOUNTER: Primary | ICD-10-CM

## 2025-07-15 PROCEDURE — 99283 EMERGENCY DEPT VISIT LOW MDM: CPT

## 2025-07-15 PROCEDURE — 99284 EMERGENCY DEPT VISIT MOD MDM: CPT | Performed by: EMERGENCY MEDICINE

## 2025-07-15 RX ORDER — POLYMYXIN B SULFATE AND TRIMETHOPRIM 1; 10000 MG/ML; [USP'U]/ML
1 SOLUTION OPHTHALMIC EVERY 4 HOURS
Qty: 10 ML | Refills: 0 | Status: SHIPPED | OUTPATIENT
Start: 2025-07-15 | End: 2025-07-22

## 2025-07-15 RX ORDER — TETRACAINE HYDROCHLORIDE 5 MG/ML
2 SOLUTION OPHTHALMIC ONCE
Status: COMPLETED | OUTPATIENT
Start: 2025-07-15 | End: 2025-07-15

## 2025-07-15 RX ADMIN — TETRACAINE HYDROCHLORIDE 2 DROP: 5 SOLUTION OPHTHALMIC at 19:28

## 2025-07-15 RX ADMIN — FLUORESCEIN SODIUM 1 STRIP: 1 STRIP OPHTHALMIC at 19:28

## 2025-07-15 NOTE — ED ATTENDING ATTESTATION
I saw and evaluated the patient. I have discussed the patient with the resident physician and agree with the resident's findings, assessment and plan as documented in the resident physician's note, unless otherwise documented below. All available laboratory and imaging studies were reviewed by myself.  I was present for key portions of any procedure(s) performed by the resident and I was immediately available to provide assistance.     I agree with the current assessment done in the Emergency Department. I have conducted an independent evaluation of this patient    Final Diagnosis:  1. Left eye injury, initial encounter    2. Abrasion of left cornea, initial encounter            Chief Complaint   Patient presents with    Eye Injury     Pt was weed whacking and was hit in the eye by an unknown object.  Having pain in his L eye.  Redness and minor swelling noted to eye. Denies any blurred or double vision.      This is a 40 y.o. male presenting for evaluation of left eye injury. Was weed wacking and was hit in the eye by an unknown object. Having pain in his left eye with associated redness. Has a mild headache as well.  No anticoagulant or antiplatelet agent use. No vision changes or tearing. Denies neck pain, back pain, chest pain, abdominal pain, extremity pain, focal neurologic symptoms, wounds, or any other injuries or complaints.        PMH:   has no past medical history on file.    PSH:   has no past surgical history on file.    Social:  Social History     Substance and Sexual Activity   Alcohol Use Never     Tobacco Use History[1]  Social History     Substance and Sexual Activity   Drug Use Not Currently    Types: Marijuana     PE:  Vitals:    07/15/25 1842   BP: 165/89   Pulse: 64   Resp: 18   Temp: 98.9 °F (37.2 °C)   TempSrc: Temporal   SpO2: 97%           Physical exam:  GENERAL APPEARANCE: Appears comfortable, no acute distress, calm and cooperative   NEURO: GCS 15, no gross focal deficits. CN II-XII  intact. 5/5 strength in all four extremities. No pronator drift. Normal finger nose finger. Normal heel to shin. No dysdiadochokinesis. Visual fields intact.  Negative Romberg. Normal gait.  HENT: No craniofacial ecchymosis, crepitus, or deformity. No rosa's sign. No raccoon eyes. No hemotympanum.   EYES: PERRL both directly and consensually. EOMI without exacerbation of pain. Mild left conjunctival injection. On slit lamp exam of left eye there are pinpoint areas of uptake at 3 oclock and 4 oclock positions. Negative john sign. Exam otherwise unremarkable. No proptosis.    Neck: Full ROM  CV: Appears well perfused  LUNGS: No respiratory distress  MSK: Normal ROM  SKIN: Normal color        Assessment and plan: This is a 40 y.o. male presenting for evaluation of left eye injury. There are 2 pinpoint areas of fluorescein uptake on exam but exam is otherwise unremarkable. Unclear if these are abrasions or foreign bodies. Offered patient irrigation for removal attempt but he declined. Will start topical antibiotics and refer to ophtho for close follow up. Strict ED return precautions provided should symptoms worsen and patient can otherwise follow up outpatient. Patient expresses an understanding and agreement with the plan and remains in good condition for discharge.       Code Status: No Order  Advance Directive and Living Will:      Power of :    POLST:      Medications   fluorescein sodium sterile ophthalmic strip 1 strip (1 strip Left Eye Given by Other 7/15/25 1928)   tetracaine 0.5 % ophthalmic solution 2 drop (2 drops Left Eye Given by Other 7/15/25 1928)     No orders to display     Orders Placed This Encounter   Procedures    Ambulatory Referral to Ophthalmology     Labs Reviewed - No data to display      Time reflects when diagnosis was documented in both MDM as applicable and the Disposition within this note       Time User Action Codes Description Comment    7/15/2025  7:49 PM Prema Faustin  Add [S05.92XA] Left eye injury, initial encounter     7/15/2025  8:01 PM Prema Faustin Add [S05.02XA] Abrasion of left cornea, initial encounter           ED Disposition       ED Disposition   Discharge    Condition   Stable    Date/Time   Tue Jul 15, 2025  7:49 PM    Comment   Francoise DelPriore discharge to home/self care.                   Follow-up Information       Follow up With Specialties Details Why Contact Info    Hitesh Dotson DO Family Medicine   [address] [facility]  [address]  [place] [address]  [phone]            Discharge Medication List as of 7/15/2025  8:02 PM        START taking these medications    Details   polymyxin b-trimethoprim (POLYTRIM) ophthalmic solution Administer 1 drop into the left eye every 4 (four) hours for 7 days, Starting Tue 7/15/2025, Until Tue 7/22/2025, Normal           CONTINUE these medications which have NOT CHANGED    Details   ibuprofen (MOTRIN) 600 mg tablet Take 1 tablet (600 mg total) by mouth every 6 (six) hours as needed for mild pain, Starting Thu 7/18/2024, Normal      methocarbamol (ROBAXIN) 500 mg tablet Take 1 tablet (500 mg total) by mouth 2 (two) times a day, Starting Thu 7/18/2024, Normal      ondansetron (ZOFRAN-ODT) 4 mg disintegrating tablet Take 1 tablet (4 mg total) by mouth every 6 (six) hours as needed for nausea or vomiting, Starting Wed 4/30/2025, Normal             Prior to Admission Medications   Prescriptions Last Dose Informant Patient Reported? Taking?   ibuprofen (MOTRIN) 600 mg tablet   No No   Sig: Take 1 tablet (600 mg total) by mouth every 6 (six) hours as needed for mild pain   methocarbamol (ROBAXIN) 500 mg tablet   No No   Sig: Take 1 tablet (500 mg total) by mouth 2 (two) times a day   ondansetron (ZOFRAN-ODT) 4 mg disintegrating tablet   No No   Sig: Take 1 tablet (4 mg total) by mouth every 6 (six) hours as needed for nausea or vomiting      Facility-Administered Medications: None         Portions of the record may have  "been created with voice recognition software. Occasional wrong word or \"sound a like\" substitutions may have occurred due to the inherent limitations of voice recognition software. Read the chart carefully and recognize, using context, where substitutions have occurred.    Electronically signed by:  Marisol Noriega           [1]   Social History  Tobacco Use   Smoking Status Never   Smokeless Tobacco Never     "

## 2025-07-18 NOTE — ED PROVIDER NOTES
Time reflects when diagnosis was documented in both MDM as applicable and the Disposition within this note       Time User Action Codes Description Comment    7/15/2025  7:49 PM Prema Faustin Add [S05.92XA] Left eye injury, initial encounter     7/15/2025  8:01 PM Prema Faustin [S05.02XA] Abrasion of left cornea, initial encounter           ED Disposition       ED Disposition   Discharge    Condition   Stable    Date/Time   Tue Jul 15, 2025  7:49 PM    Comment   Francoise DelPriore discharge to home/self care.                   Assessment & Plan       Medical Decision Making  40-year-old male with no significant medical history who is presenting with left eye pain and redness after hitting himself in the eye this morning with a weed Abby.    VSS. On exam, patient has mild conjunctival erythema of the left eye. No focal neuro deficits. On slit-lamp exam, patient has fluorescein uptake at 3 and 8:00.    Differential diagnosis includes but is not limited to foreign body, corneal abrasion, iritis, conjunctivitis. Discussed with patient that there was fluorescein uptake on exam of the left eye, however unsure if this is due to foreign body or laceration. Offered patient irrigation of the eye as well as pain medicine, however patient denied both at this time. Will prescribe patient Polytrim eyedrops and will provide patient with a referral to ophthalmology. Also encouraged patient to follow-up with his primary care doctor. Discussed strict return to the ED precautions if patient's symptoms worsen. Patient understands and agrees      Risk  Prescription drug management.             Medications   fluorescein sodium sterile ophthalmic strip 1 strip (1 strip Left Eye Given by Other 7/15/25 1928)   tetracaine 0.5 % ophthalmic solution 2 drop (2 drops Left Eye Given by Other 7/15/25 1928)       ED Risk Strat Scores                    No data recorded                            History of Present Illness       Chief  Complaint   Patient presents with    Eye Injury     Pt was weed whacking and was hit in the eye by an unknown object.  Having pain in his L eye.  Redness and minor swelling noted to eye. Denies any blurred or double vision.        Past Medical History[1]   Past Surgical History[2]   Family History[3]   Social History[4]   E-Cigarette/Vaping    E-Cigarette Use Never User       E-Cigarette/Vaping Substances      I have reviewed and agree with the history as documented.     40-year-old male with no significant medical history who is presenting with left eye pain and redness after hitting himself in the eye this morning with a weed Abby. Patient reports throughout the day his symptoms started worsening and he started endorsing concern that he may have had a foreign body in the eye, prompting him to come to the emergency room for evaluation. He is also complaining of a mild headache. No other injuries, no falls. Put on blood thinner. He denies fever, chills, numbness/tingling, vision changes/blurry vision. Patient does not wear contact lenses or glasses.           Review of Systems   Constitutional:  Negative for chills and fever.   Eyes:  Positive for pain and redness. Negative for discharge.   Skin:  Negative for rash.   Neurological:  Positive for headaches. Negative for numbness.   All other systems reviewed and are negative.          Objective       ED Triage Vitals [07/15/25 1842]   Temperature Pulse Blood Pressure Respirations SpO2 Patient Position - Orthostatic VS   98.9 °F (37.2 °C) 64 165/89 18 97 % --      Temp Source Heart Rate Source BP Location FiO2 (%) Pain Score    Temporal -- -- -- 3      Vitals      Date and Time Temp Pulse SpO2 Resp BP Pain Score FACES Pain Rating User   07/15/25 1842 98.9 °F (37.2 °C) 64 97 % 18 165/89 3 -- BMM            Physical Exam  Vitals reviewed.   Constitutional:       Appearance: Normal appearance.   HENT:      Head: Normocephalic and atraumatic. No left periorbital  erythema.      Mouth/Throat:      Mouth: Mucous membranes are moist.     Eyes:      Extraocular Movements: Extraocular movements intact.      Conjunctiva/sclera:      Left eye: Left conjunctiva is injected. No exudate.     Pupils: Pupils are equal, round, and reactive to light.      Comments: Mild conjunctival injection of left eye   Anterior chamber normal on slit evaluation  No proptosis  No nystagmus   Fluorescein uptake at 3 o'clock and 8 o'clock.    Visual acuity: OD 20/40, OS 20/32, OU 20/25       Cardiovascular:      Rate and Rhythm: Normal rate and regular rhythm.      Pulses: Normal pulses.      Heart sounds: Normal heart sounds. No murmur heard.     No friction rub. No gallop.   Pulmonary:      Effort: Pulmonary effort is normal.      Breath sounds: Normal breath sounds. No wheezing, rhonchi or rales.     Musculoskeletal:         General: Normal range of motion.      Cervical back: Normal range of motion and neck supple. No rigidity.     Skin:     General: Skin is warm and dry.     Neurological:      General: No focal deficit present.      Mental Status: He is alert and oriented to person, place, and time.      Cranial Nerves: Cranial nerves 2-12 are intact. No dysarthria or facial asymmetry.      Sensory: Sensation is intact.      Motor: Motor function is intact. No pronator drift.      Coordination: Finger-Nose-Finger Test normal.      Gait: Gait is intact.     Psychiatric:         Mood and Affect: Mood normal.         Behavior: Behavior normal.         Results Reviewed       None            No orders to display       Procedures    ED Medication and Procedure Management   Prior to Admission Medications   Prescriptions Last Dose Informant Patient Reported? Taking?   ibuprofen (MOTRIN) 600 mg tablet   No No   Sig: Take 1 tablet (600 mg total) by mouth every 6 (six) hours as needed for mild pain   methocarbamol (ROBAXIN) 500 mg tablet   No No   Sig: Take 1 tablet (500 mg total) by mouth 2 (two) times a day    ondansetron (ZOFRAN-ODT) 4 mg disintegrating tablet   No No   Sig: Take 1 tablet (4 mg total) by mouth every 6 (six) hours as needed for nausea or vomiting      Facility-Administered Medications: None     Discharge Medication List as of 7/15/2025  8:02 PM        START taking these medications    Details   polymyxin b-trimethoprim (POLYTRIM) ophthalmic solution Administer 1 drop into the left eye every 4 (four) hours for 7 days, Starting Tue 7/15/2025, Until Tue 7/22/2025, Normal           CONTINUE these medications which have NOT CHANGED    Details   ibuprofen (MOTRIN) 600 mg tablet Take 1 tablet (600 mg total) by mouth every 6 (six) hours as needed for mild pain, Starting Thu 7/18/2024, Normal      methocarbamol (ROBAXIN) 500 mg tablet Take 1 tablet (500 mg total) by mouth 2 (two) times a day, Starting Thu 7/18/2024, Normal      ondansetron (ZOFRAN-ODT) 4 mg disintegrating tablet Take 1 tablet (4 mg total) by mouth every 6 (six) hours as needed for nausea or vomiting, Starting Wed 4/30/2025, Normal             ED SEPSIS DOCUMENTATION   Time reflects when diagnosis was documented in both MDM as applicable and the Disposition within this note       Time User Action Codes Description Comment    7/15/2025  7:49 PM Prema Faustin [S05.92XA] Left eye injury, initial encounter     7/15/2025  8:01 PM Prema Faustin [S05.02XA] Abrasion of left cornea, initial encounter                      [1] No past medical history on file.  [2] No past surgical history on file.  [3] No family history on file.  [4]   Social History  Tobacco Use    Smoking status: Never    Smokeless tobacco: Never   Vaping Use    Vaping status: Never Used   Substance Use Topics    Alcohol use: Never    Drug use: Not Currently     Types: Marijuana        Prema Faustin DO  07/18/25 0115